# Patient Record
(demographics unavailable — no encounter records)

---

## 2025-03-22 NOTE — PHYSICAL EXAM
[Fully active, able to carry on all pre-disease performance without restriction] : Status 0 - Fully active, able to carry on all pre-disease performance without restriction [General Appearance - Alert] : alert [General Appearance - In No Acute Distress] : in no acute distress [Sclera] : the sclera and conjunctiva were normal [PERRL With Normal Accommodation] : pupils were equal in size, round, and reactive to light [Extraocular Movements] : extraocular movements were intact [Outer Ear] : the ears and nose were normal in appearance [Oropharynx] : the oropharynx was normal [Neck Appearance] : the appearance of the neck was normal [Neck Cervical Mass (___cm)] : no neck mass was observed [Jugular Venous Distention Increased] : there was no jugular-venous distention [Thyroid Diffuse Enlargement] : the thyroid was not enlarged [Thyroid Nodule] : there were no palpable thyroid nodules [Auscultation Breath Sounds / Voice Sounds] : lungs were clear to auscultation bilaterally [Heart Rate And Rhythm] : heart rate was normal and rhythm regular [Heart Sounds] : normal S1 and S2 [Heart Sounds Gallop] : no gallops [Murmurs] : no murmurs [Heart Sounds Pericardial Friction Rub] : no pericardial rub [Examination Of The Chest] : the chest was normal in appearance [Chest Visual Inspection Thoracic Asymmetry] : no chest asymmetry [Diminished Respiratory Excursion] : normal chest expansion [2+] : left 2+ [Breast Appearance] : normal in appearance [Breast Palpation Mass] : no palpable masses [Bowel Sounds] : normal bowel sounds [Abdomen Soft] : soft [Abdomen Tenderness] : non-tender [Abdomen Mass (___ Cm)] : no abdominal mass palpated [FreeTextEntry1] : deferred [Cervical Lymph Nodes Enlarged Posterior Bilaterally] : posterior cervical [Cervical Lymph Nodes Enlarged Anterior Bilaterally] : anterior cervical [Supraclavicular Lymph Nodes Enlarged Bilaterally] : supraclavicular [No CVA Tenderness] : no ~M costovertebral angle tenderness [No Spinal Tenderness] : no spinal tenderness [Abnormal Walk] : normal gait [Nail Clubbing] : no clubbing  or cyanosis of the fingernails [Musculoskeletal - Swelling] : no joint swelling seen [Motor Tone] : muscle strength and tone were normal [Skin Color & Pigmentation] : normal skin color and pigmentation [Skin Turgor] : normal skin turgor [] : no rash [Deep Tendon Reflexes (DTR)] : deep tendon reflexes were 2+ and symmetric [Sensation] : the sensory exam was normal to light touch and pinprick [No Focal Deficits] : no focal deficits [Oriented To Time, Place, And Person] : oriented to person, place, and time [Impaired Insight] : insight and judgment were intact [Affect] : the affect was normal

## 2025-03-22 NOTE — CONSULT LETTER
[Dear  ___] : Dear  [unfilled], [Consult Letter:] : I had the pleasure of evaluating your patient, [unfilled]. [Consult Closing:] : Thank you very much for allowing me to participate in the care of this patient.  If you have any questions, please do not hesitate to contact me. [Sincerely,] : Sincerely, [FreeTextEntry2] : Dr. Kelvin Sousa (PCP/Ref) [FreeTextEntry3] : Rafa Neville MD, MPH System Director of Thoracic Surgery Director of Comprehensive Lung and Foregut [place] Professor Cardiovascular & Thoracic Surgery  [facility] at [facility]

## 2025-03-22 NOTE — HISTORY OF PRESENT ILLNESS
[FreeTextEntry1] : Ms. FRIDA LINDQUIST, 59 year old female, never smoker, w/ hx of DM2, who presented with cough for months, CT incidentally revealed a lung mass. Referred by Dr. Kelvin Sousa (PCP/Ref).   CT Chest on 3/14/25: - there is a mass in the superior segment of LLL posterior to the Lt hilum 4.7 x 4.5 cm   Pt presents today for CT Sx consultation. Pt admits to cough, denies fever, chills, SOB, hemoptysis, CP, pain or recent illness.

## 2025-03-22 NOTE — CONSULT LETTER
[Dear  ___] : Dear  [unfilled], [Consult Letter:] : I had the pleasure of evaluating your patient, [unfilled]. [Consult Closing:] : Thank you very much for allowing me to participate in the care of this patient.  If you have any questions, please do not hesitate to contact me. [Sincerely,] : Sincerely, [FreeTextEntry2] : Dr. Kelvin Sousa (PCP/Ref) [FreeTextEntry3] : Rafa Neville MD, MPH System Director of Thoracic Surgery Director of Comprehensive Lung and Foregut [place] Professor Cardiovascular & Thoracic Surgery  [facility] at [facility]

## 2025-03-22 NOTE — ASSESSMENT
[FreeTextEntry1] : Ms. FRIDA LINDQUIST, 59 year old female, never smoker, w/ hx of DM2, who presented with cough for months, CT incidentally revealed a lung mass. Referred by Dr. Kelvin Sousa (PCP/Ref).   CT Chest on 3/14/25: - there is a mass in the superior segment of LLL posterior to the Lt hilum 4.7 x 4.5 cm   I have reviewed the patient's medical records and diagnostic images at time of this office consultation and have made the following recommendation: 1. CT reviewed with pt, large lung mass, highly suspicious for lung cancer. I recommended FB, EBUS biopsy on 4/3/25. Risks of surgery includes but not limited to pain, infection, bleeding, injuries to surrounding structures, and need for further procedure, stroke or death. Benefits and alternatives explained to patient, all questions answered, patient agreed to proceed with surgery. 2. PFTs 3. PET/CT 4. MRI brain  5. Medical clearance and PST 6. Hold Jardiance 3 days prior to surgery   I, Dr. Rafa Neville, personally performed the evaluation and management (E/M) services for this established patient who presents today with (a) new problem(s)/exacerbation of (an) existing condition(s).  That E/M includes conducting the examination, assessing all new/exacerbated conditions, and establishing a new plan of care.  Today, my ACP, MANDY Yeboah-BC was here to observe my evaluation and management services for this new problem/exacerbated condition to be followed going forward.

## 2025-04-11 NOTE — CONSULT LETTER
[Dear  ___] : Dear  [unfilled], [Consult Letter:] : I had the pleasure of evaluating your patient, [unfilled]. [Consult Closing:] : Thank you very much for allowing me to participate in the care of this patient.  If you have any questions, please do not hesitate to contact me. [Sincerely,] : Sincerely, [FreeTextEntry2] : Dr. Kelvin Sousa (PCP/Ref) [FreeTextEntry3] : Rafa Neville MD, MPH System Director of Thoracic Surgery Director of Comprehensive Lung and Foregut [place] Professor Cardiovascular & Thoracic Surgery  [facility] at [facility]

## 2025-04-11 NOTE — ASSESSMENT
[FreeTextEntry1] : Ms. FRIDA LINDQUIST, 59 year old female, never smoker, w/ hx of DM2, who presented with cough for months, CT incidentally revealed a lung mass. Referred by Dr. Kelvin Sousa (PCP/Ref).  CT Chest on 3/14/25: - there is a mass in the superior segment of LLL posterior to the Lt hilum 4.7 x 4.5 cm  PFT 3/28/25: % FEV1 98% DLCO 118%  MRI Brain 4/2/25: - RENA  Now s/p FB with biopsy and BAL of LLL superior segment of bronchus, EBUS, FNA of left level 4 and Level 7 lymph node, FB with aspiration of secretions on 4/3/25. Path of LLL, BAL, and Lymph node Level 7 revealed adenocarcinoma. Immunohistochemical stain for TTF-1 is positive in tumor cells  CXR 4/3/25: Post bronchoscopy with pneumomediastinum and subcutaneous emphysema  PET/CT 4/8/25 MSR: -  soft tissue emphysema noted within the subcutaneous soft tissues/fascial planes in the neck, subcutaneous emphysema noted within bilateral arms, chest, abdomen, and pelvis, pneumomediastinum noted within the chest, and a small amount of pneumoperitoneum noted within the abdomen and pelvis - mild paranasal sinus mucosal opacification - active 55 mm left lower lobe/hilar airspace opacity (SUV max 12.4 image 52-65) likely likely attributable to malignancy. There are small foci of air within the mass. - mild surrounding groundglass airspace opacities - a few small areas of subsegmental atelectasis and/or scarring in both lungs. - several mildly active subcentimeter short axis mediastinal lymph nodes identified with the most active measuring up to 8 mm in short axis (left paratracheal, SUV max 3, image 54). - a nonspecific haziness of the mesenteric fat in the anterior left abdomen with small subcentimeter short axis lymph nodes.  I have reviewed the patient's medical records and diagnostic images at time of this office consultation and have made the following recommendation: 1. Path and all studies reviewed, metastatic AdenoCA to LNs, refer pt to Oncology Dr. Rojas for chemo.  2. RTC after chemo with new scan to discuss surgery.   I, ANGEL Mares, personally performed the evaluation and management (E/M) services for this established patient who presents today with (a) new problem(s)/exacerbation of (an) existing condition(s).  That E/M includes conducting the examination, assessing all new/exacerbated conditions, and establishing a new plan of care.  Today, my ACP, Patsy Sethi, FNP-BC was here to observe my evaluation and management services for this new problem/exacerbated condition to be followed going forward.

## 2025-04-11 NOTE — CONSULT LETTER
[Dear  ___] : Dear  [unfilled], [Consult Letter:] : I had the pleasure of evaluating your patient, [unfilled]. [Consult Closing:] : Thank you very much for allowing me to participate in the care of this patient.  If you have any questions, please do not hesitate to contact me. [Sincerely,] : Sincerely, [FreeTextEntry2] : Dr. Kelvin Sousa (PCP/Ref) [FreeTextEntry3] : Rafa Neville MD, MPH System Director of Thoracic Surgery Director of Comprehensive Lung and Foregut [place] Professor Cardiovascular & Thoracic Surgery  [facility] of Medicine at [facility]

## 2025-04-11 NOTE — DATA REVIEWED
[FreeTextEntry1] :  I have independently reviewed the following: Pathology report CXR 4/3/25 PET/CT 4/8/25 MSR PFT 3/28/25 MRI Brain 4/2/25:

## 2025-04-11 NOTE — HISTORY OF PRESENT ILLNESS
[FreeTextEntry1] : Ms. FRIDA LINDQUIST, 59 year old female, never smoker, w/ hx of DM2, who presented with cough for months, CT incidentally revealed a lung mass. Referred by Dr. Kelvin Sousa (PCP/Ref).  CT Chest on 3/14/25: - there is a mass in the superior segment of LLL posterior to the Lt hilum 4.7 x 4.5 cm  PFT 3/28/25: % FEV1 98% DLCO 118%  MRI Brain 4/2/25: - RENA  Now s/p FB with biopsy and BAL of LLL superior segment of bronchus, EBUS, FNA of left level 4 and Level 7 lymph node, FB with aspiration of secretions on 4/3/25. Path of LLL, BAL, and Lymph node Level 7 revealed adenocarcinoma. Immunohistochemical stain for TTF-1 is positive in tumor cells  CXR 4/3/25: Post bronchoscopy with pneumomediastinum and subcutaneous emphysema  PET/CT 4/8/25 MSR: -  soft tissue emphysema noted within the subcutaneous soft tissues/fascial planes in the neck, subcutaneous emphysema noted within bilateral arms, chest, abdomen, and pelvis, pneumomediastinum noted within the chest, and a small amount of pneumoperitoneum noted within the abdomen and pelvis - mild paranasal sinus mucosal opacification - active 55 mm left lower lobe/hilar airspace opacity (SUV max 12.4 image 52-65) likely likely attributable to malignancy. There are small foci of air within the mass. - mild surrounding groundglass airspace opacities - a few small areas of subsegmental atelectasis and/or scarring in both lungs. - several mildly active subcentimeter short axis mediastinal lymph nodes identified with the most active measuring up to 8 mm in short axis (left paratracheal, SUV max 3, image 54). - a nonspecific haziness of the mesenteric fat in the anterior left abdomen with small subcentimeter short axis lymph nodes.  Patient presents for post biopsy follow up.

## 2025-04-11 NOTE — CONSULT LETTER
[Dear  ___] : Dear  [unfilled], [Consult Letter:] : I had the pleasure of evaluating your patient, [unfilled]. [Consult Closing:] : Thank you very much for allowing me to participate in the care of this patient.  If you have any questions, please do not hesitate to contact me. [Sincerely,] : Sincerely, [FreeTextEntry2] : Dr. Kelvin Sousa (PCP/Ref) [FreeTextEntry3] : Rafa Neville MD, MPH System Director of Thoracic Surgery Director of Comprehensive Lung and Foregut [place] Professor Cardiovascular & Thoracic Surgery  [facility] of Medicine at [facility]

## 2025-07-08 NOTE — HISTORY OF PRESENT ILLNESS
[FreeTextEntry1] : Ms. FRIDA LINDQUIST, 60 year old female, never smoker, w/ hx of DM2, who presented with cough for months, CT incidentally revealed a lung mass. Referred by Dr. Kelvin Sousa (PCP/Ref).  CT Chest on 3/14/25: - there is a mass in the superior segment of LLL posterior to the Lt hilum 4.7 x 4.5 cm  PFT 3/28/25: % FEV1 98% DLCO 118%  MRI Brain 4/2/25: - RENA  Now s/p FB with biopsy and BAL of LLL superior segment of bronchus, EBUS, FNA of left level 4 and Level 7 lymph node, FB with aspiration of secretions on 4/3/25. Path of LLL, BAL, and Lymph node Level 7 revealed adenocarcinoma. Immunohistochemical stain for TTF-1 is positive in tumor cells  CXR 4/3/25: Post bronchoscopy with pneumomediastinum and subcutaneous emphysema  PET/CT 4/8/25 MSR: -  soft tissue emphysema noted within the subcutaneous soft tissues/fascial planes in the neck, subcutaneous emphysema noted within bilateral arms, chest, abdomen, and pelvis, pneumomediastinum noted within the chest, and a small amount of pneumoperitoneum noted within the abdomen and pelvis - mild paranasal sinus mucosal opacification - active 55 mm left lower lobe/hilar airspace opacity (SUV max 12.4 image 52-65) likely likely attributable to malignancy. There are small foci of air within the mass. - mild surrounding groundglass airspace opacities - a few small areas of subsegmental atelectasis and/or scarring in both lungs. - several mildly active subcentimeter short axis mediastinal lymph nodes identified with the most active measuring up to 8 mm in short axis (left paratracheal, SUV max 3, image 54). - a nonspecific haziness of the mesenteric fat in the anterior left abdomen with small subcentimeter short axis lymph nodes.  Pt saw Dr. Arleth Rojas, completed Carbo/Alimta (4/21/25 to 6/23/2025), with Carbo at lower dose. Pt now started Taggrisso.   Pt presents today for clinical follow up to discuss surgery.  Pt has no complains, no recent changes since last seen.

## 2025-07-08 NOTE — CONSULT LETTER
[Dear  ___] : Dear  [unfilled], [Consult Letter:] : I had the pleasure of evaluating your patient, [unfilled]. [Consult Closing:] : Thank you very much for allowing me to participate in the care of this patient.  If you have any questions, please do not hesitate to contact me. [Sincerely,] : Sincerely, [FreeTextEntry2] : Dr. Kelvin Sousa (PCP/Ref) [FreeTextEntry3] : Rafa Neville MD, MPH System Director of Thoracic Surgery Director of Comprehensive Lung and Foregut [place] Professor Cardiovascular & Thoracic Surgery  [facility] at [facility]

## 2025-07-08 NOTE — ASSESSMENT
[FreeTextEntry1] : Ms. FRIDA LINDQUIST, 60 year old female, never smoker, w/ hx of DM2, who presented with cough for months, CT incidentally revealed a lung mass. Referred by Dr. Kelvin Sousa (PCP/Ref).  Now s/p FB with biopsy and BAL of LLL superior segment of bronchus, EBUS, FNA of left level 4 and Level 7 lymph node, FB with aspiration of secretions on 4/3/25. Path of LLL, BAL, and Lymph node Level 7 revealed adenocarcinoma. Immunohistochemical stain for TTF-1 is positive in tumor cells  Pt saw Dr. Arleth Rojas, completed Carbo/Alimta (4/21/25 to 6/23/2025), with Carbo at lower dose. Pt now started Taggrisso.   I have reviewed the patient's medical records and diagnostic images at time of this office consultation and have made the following recommendation: 1. Pt completed adjuvant chemo with Dr. Arleth Rojas. Last CT done on 3/14/25, last PET/CT on 4/8/25. Referral given for CT chest scan with contrast and PET scan.  2. Return after Scan complete for consultation.   I, ANGEL Mares, personally performed the evaluation and management (E/M) services for this established patient who presents today with (a) new problem(s)/exacerbation of (an) existing condition(s).  That E/M includes conducting the examination, assessing all new/exacerbated conditions, and establishing a new plan of care.  Today, my ACP, Patsy Sethi, MANDY-BC was here to observe my evaluation and management services for this new problem/exacerbated condition to be followed going forward.

## 2025-07-08 NOTE — CONSULT LETTER
[Dear  ___] : Dear  [unfilled], [Consult Letter:] : I had the pleasure of evaluating your patient, [unfilled]. [Consult Closing:] : Thank you very much for allowing me to participate in the care of this patient.  If you have any questions, please do not hesitate to contact me. [Sincerely,] : Sincerely, [FreeTextEntry2] : Dr. Kelvin Sousa (PCP/Ref) [FreeTextEntry3] : Rafa Neville MD, MPH System Director of Thoracic Surgery Director of Comprehensive Lung and Foregut [place] Professor Cardiovascular & Thoracic Surgery  [facility] at [facility]

## 2025-07-08 NOTE — CONSULT LETTER
[Dear  ___] : Dear  [unfilled], [Consult Letter:] : I had the pleasure of evaluating your patient, [unfilled]. [Consult Closing:] : Thank you very much for allowing me to participate in the care of this patient.  If you have any questions, please do not hesitate to contact me. [Sincerely,] : Sincerely, [FreeTextEntry2] : Dr. Kelvin Sousa (PCP/Ref) [FreeTextEntry3] : Rafa Neville MD, MPH System Director of Thoracic Surgery Director of Comprehensive Lung and Foregut [place] Professor Cardiovascular & Thoracic Surgery  [facility] at [facility]

## 2025-07-24 NOTE — PHYSICAL EXAM
[] : no respiratory distress [Respiration, Rhythm And Depth] : normal respiratory rhythm and effort [Exaggerated Use Of Accessory Muscles For Inspiration] : no accessory muscle use [Auscultation Breath Sounds / Voice Sounds] : lungs were clear to auscultation bilaterally [Heart Rate And Rhythm] : heart rate was normal and rhythm regular [Examination Of The Chest] : the chest was normal in appearance [Chest Visual Inspection Thoracic Asymmetry] : no chest asymmetry [Diminished Respiratory Excursion] : normal chest expansion [Involuntary Movements] : no involuntary movements were seen [Skin Color & Pigmentation] : normal skin color and pigmentation [No Focal Deficits] : no focal deficits [Oriented To Time, Place, And Person] : oriented to person, place, and time

## 2025-07-27 NOTE — CONSULT LETTER
[Dear  ___] : Dear  [unfilled], [Consult Letter:] : I had the pleasure of evaluating your patient, [unfilled]. [Consult Closing:] : Thank you very much for allowing me to participate in the care of this patient.  If you have any questions, please do not hesitate to contact me. [Sincerely,] : Sincerely, [FreeTextEntry2] : Dr. Kelvin Sousa (PCP/Ref) [FreeTextEntry3] : Rafa Neville MD, MPH System Director of Thoracic Surgery Director of Comprehensive Lung and Foregut [place] Professor Cardiovascular & Thoracic Surgery  [facility] at [facility]

## 2025-07-27 NOTE — CONSULT LETTER
[Dear  ___] : Dear  [unfilled], [Consult Letter:] : I had the pleasure of evaluating your patient, [unfilled]. [Consult Closing:] : Thank you very much for allowing me to participate in the care of this patient.  If you have any questions, please do not hesitate to contact me. [Sincerely,] : Sincerely, [FreeTextEntry2] : Dr. Kelvin Sousa (PCP/Ref) [FreeTextEntry3] : Rafa Neville MD, MPH System Director of Thoracic Surgery Director of Comprehensive Lung and Foregut [place] Professor Cardiovascular & Thoracic Surgery  [facility] at [facility]

## 2025-07-27 NOTE — CONSULT LETTER
[Dear  ___] : Dear  [unfilled], [Consult Letter:] : I had the pleasure of evaluating your patient, [unfilled]. [Consult Closing:] : Thank you very much for allowing me to participate in the care of this patient.  If you have any questions, please do not hesitate to contact me. [Sincerely,] : Sincerely, [FreeTextEntry2] : Dr. Kelvin Sousa (PCP/Ref) [FreeTextEntry3] : Rafa Neville MD, MPH System Director of Thoracic Surgery Director of Comprehensive Lung and Foregut [place] Professor Cardiovascular & Thoracic Surgery  [facility] School of Medicine at [facility]

## 2025-07-27 NOTE — HISTORY OF PRESENT ILLNESS
[FreeTextEntry1] : Ms. FRIDA LINDQUIST, 60 year old female, never smoker, w/ hx of DM2, who presented with cough for months, CT incidentally revealed a lung mass. Referred by Dr. Kelvin Sousa (PCP/Ref).  CT Chest on 3/14/25: - there is a mass in the superior segment of LLL posterior to the Lt hilum 4.7 x 4.5 cm  PFT 3/28/25: % FEV1 98% DLCO 118%  MRI Brain 4/2/25: - RENA  Now s/p FB with biopsy and BAL of LLL superior segment of bronchus, EBUS, FNA of left level 4 and Level 7 lymph node, FB with aspiration of secretions on 4/3/25. Path of LLL, BAL, and Lymph node Level 7 revealed adenocarcinoma. Immunohistochemical stain for TTF-1 is positive in tumor cells  PET/CT 4/8/25 MSR: -  soft tissue emphysema noted within the subcutaneous soft tissues/fascial planes in the neck, subcutaneous emphysema noted within bilateral arms, chest, abdomen, and pelvis, pneumomediastinum noted within the chest, and a small amount of pneumoperitoneum noted within the abdomen and pelvis - mild paranasal sinus mucosal opacification - active 55 mm left lower lobe/hilar airspace opacity (SUV max 12.4 image 52-65) likely likely attributable to malignancy. There are small foci of air within the mass. - mild surrounding groundglass airspace opacities - a few small areas of subsegmental atelectasis and/or scarring in both lungs. - several mildly active subcentimeter short axis mediastinal lymph nodes identified with the most active measuring up to 8 mm in short axis (left paratracheal, SUV max 3, image 54). - a nonspecific haziness of the mesenteric fat in the anterior left abdomen with small subcentimeter short axis lymph nodes.  Pt saw Dr. Arleth Rojas, completed Carbo/Alimta (4/21/25 to 6/23/2025), with Carbo at lower dose. Pt now started Taggrisso.   Patient was seen on 07/03/2025, Pt completed adjuvant chemo with Dr. Arleth Rojas. Last CT done on 3/14/25, last PET/CT on 4/8/25. Referral given for CT chest scan with contrast and PET scan.  CT chest with IV contrast on 07/14/2025:  -  Decreased focal consolidation centered at the superior segment of the left lower lobe (series 4, image 118) suggestive of resolving pneumonia/pneumonitis. Adjacent linear subsegmental atelectasis and/or scarring.  - Mild coronary artery calcifications. - Left thyroid 1.6 cm nodule. Dedicated sonographic evaluation is advised.  PET/CT denied.   Patient is here today for a follow up.

## 2025-07-27 NOTE — ASSESSMENT
[FreeTextEntry1] : Ms. FRIDA LINDQUIST, 60 year old female, never smoker, w/ hx of DM2, who presented with cough for months, CT incidentally revealed a lung mass. Referred by Dr. Kelvin Sousa (PCP/Ref).  Now s/p FB with biopsy and BAL of LLL superior segment of bronchus, EBUS, FNA of left level 4 and Level 7 lymph node, FB with aspiration of secretions on 4/3/25. Path of LLL, BAL, and Lymph node Level 7 revealed adenocarcinoma. Immunohistochemical stain for TTF-1 is positive in tumor cells  Pt saw Dr. Arleth Rojas, completed Carbo/Alimta (4/21/25 to 6/23/2025), with Carbo at lower dose. Pt now started Taggrisso.   I have reviewed the patient's medical records and diagnostic images at time of this office consultation and have made the following recommendation: 1. CT chest reviewed and explained to patient, decreased focal consolidation centered at the superior segment of the left lower lobe after neoadjuvant treatment, case was discussed with Dr. Rojas over the phone, recommended surgical resection. Will schedule for Left VATS, RA, LLLobectomy, MLND on 08/04/2025. The risks benefits and alternatives of the procedure were explained to the patient. All of her questions were answered, and patient freely consented to the procedure.  2. PFTs 3. PET/CT was denied, will forgo at current time.  4. Medical clearance and PST. 5. Hold Jardiance 3 days and Hold Targgrisso for a week prior to surgery   I, ANGEL Mares, personally performed the evaluation and management (E/M) services for this established patient who follow up today with an existing condition.  That E/M includes conducting the examination, assessing all new/exacerbated/existing conditions, and establishing a plan of care.  Today, my ACP, SONYA Hwang, was here to observe my evaluation and management services for this existing condition to be followed going forward.

## 2025-07-27 NOTE — ASSESSMENT
[FreeTextEntry1] : Ms. FIRDA LINDQUIST, 60 year old female, never smoker, w/ hx of DM2, who presented with cough for months, CT incidentally revealed a lung mass. Referred by Dr. Kelvin Sousa (PCP/Ref).  Now s/p FB with biopsy and BAL of LLL superior segment of bronchus, EBUS, FNA of left level 4 and Level 7 lymph node, FB with aspiration of secretions on 4/3/25. Path of LLL, BAL, and Lymph node Level 7 revealed adenocarcinoma. Immunohistochemical stain for TTF-1 is positive in tumor cells  Pt saw Dr. Arleth Rojas, completed Carbo/Alimta (4/21/25 to 6/23/2025), with Carbo at lower dose. Pt now started Taggrisso.   I have reviewed the patient's medical records and diagnostic images at time of this office consultation and have made the following recommendation: 1. CT chest reviewed and explained to patient, decreased focal consolidation centered at the superior segment of the left lower lobe after neoadjuvant treatment, case was discussed with Dr. Rojas over the phone, recommended surgical resection. Will schedule for Left VATS, RA, LLLobectomy, MLND on 08/04/2025. The risks benefits and alternatives of the procedure were explained to the patient. All of her questions were answered, and patient freely consented to the procedure.  2. PFTs 3. PET/CT was denied, will forgo at current time.  4. Medical clearance and PST. 5. Hold Jardiance 3 days and Hold Targgrisso for a week prior to surgery   I, ANGEL Mares, personally performed the evaluation and management (E/M) services for this established patient who follow up today with an existing condition.  That E/M includes conducting the examination, assessing all new/exacerbated/existing conditions, and establishing a plan of care.  Today, my ACP, SONYA Hwang, was here to observe my evaluation and management services for this existing condition to be followed going forward.